# Patient Record
Sex: MALE | Race: WHITE
[De-identification: names, ages, dates, MRNs, and addresses within clinical notes are randomized per-mention and may not be internally consistent; named-entity substitution may affect disease eponyms.]

---

## 2017-10-17 NOTE — CT
CT GUIDED LUMBAR ASPIRATION:

 

History: 

62-year-old male with retrothecal fluid collection within laminectomy defect at L4 and L4-5. 

 

Technique: 

Signed informed consent obtained. Patient was placed prone on CT table. Skin over the lower back was
 prepped and draped in the usual sterile fashion. 25 gauge needle was used to apply buffered Lidocai
ne superficially. Utilizing CT guidance, first a 22 gauge spinal needle was incrementally advanced i
nto the region of the laminectomy defect at the L4 level. With distal tip positioned very close to t
he dorsal surface of the thecal sac, the stylet was removed, and aspiration was attempted. There was
 no return of any fluid, either blood, pus, or CSF. The patient reported mild pain, and therefore, a
dditional Lidocaine was injected into the 22 gauge spinal needle while it was withdrawn through the 
retrospinal soft tissues. Next, an 18 gauge spinal needle was incrementally advanced at a slightly m
ore inferior position at L4, with distal tip abutting the dorsal surface of the thecal sac but not p
enetrating the thecal sac. After removal of stylet, aspiration with a 10 ml syringe yielded a tiny a
mount of viscous, bloody, particulate material (blood clots), less than 1 ml.  This was sent to [facility] for culture and sensitivity. There was no CSF return. Patient experienced bilateral lower ext
remity hypesthesia, apparently due to epidural infiltration of a small amount of the Lidocaine, whic
h subsequently resolved while the patient was in the post procedure holding room. Otherwise, there w
as no major complication, and the patient tolerated the procedure well. 

 

IMPRESSION: 

Less than 1 ml of highly viscous red material (blood clots) aspirated through 18 gauge spinal needle
 from the tiny fluid collection at the laminectomy defect abutting the dorsal surface of the thecal 
sac. This was sent to laboratory for culture and sensitivity. 

 

POS: [facility]

## 2017-11-17 NOTE — OP
DATE OF SURGERY:  11/17/2017

 

SURGEON:  L. Gerard III Toussaint, M.D.

 

ASSISTANT:  Alfredo Resendiz PA-C.

 

PREOPERATIVE INDICATION:  Prevent neurological deterioration.

 

PREOPERATIVE DIAGNOSES:  Prior lumbar decompression and fusion, epidural fluid collection, and sacral
 dermatomal paresthesias.

 

POSTOPERATIVE DIAGNOSES:  Prior lumbar decompression and fusion, epidural fluid collection, left L3 p
ars fracture, erosion of dura, pseudomeningocele, and spinal instability.

 

OPERATIVE PROCEDURE:  Reopening lumbar incision, exploration of fusion, discovery of pars fracture, d
iscovery of pseudomeningocele, repair of pseudomeningocele, microsurgery, extension of fusion, arthro
desis L3-L4, posterolateral arthrodesis L3-L4, pedicle screw and zion instrumentation L3-L5.

 

PREOPERATIVE MEDICATION:  Ancef 2 grams IV.

 

DRAIN NUMBER:  Zero.

 

DRAIN TYPE:  None.

 

OPERATIVE DICTATION:  The patient was brought to the operating room.  General endotracheal anesthesia
 was induced.  The patient was positioned prone on the operating table with his chest and hips suppor
rosa by gel-filled chest rolls.  A lateral fluoro radiograph confirmed that the previous incision woul
d give us appropriate access to the lumbosacral spine fluid collection.  The lumbar skin was sterilel
y prepped and draped.  We opened with a 10 blade knife and controlled bleeding with bipolar cautery. 
 We used monopolar cautery to dissect through scar tissue to the thoracodorsal fascia.  We incised th
e fascia in the midline and reflected the paraspinal muscles off the spinous process of L2 above and 
L5 below.  We reflected the paraspinal muscles out of the surgical cord or over to the facet joints a
t L3-L4 and L4-L5 and placed self-retaining retractors.  We identified the pedicle screw and zion comp
oscar at the L3-4, L4-5 facet joints.  In the L3-4 facet joint on the right side, there was CSF egress.
  Scar tissue was left on the back of the dura until we brought the operating microscope into the [facility].

 

Under microscopic magnification using microsurgical techniques, we carefully peeled the scar tissue a
way from the lateral confines of the spinal canal.  We removed the remainder of the L3 lamina and we 
discovered a pars fracture on the left at L3.  This was not predicted.  The distal part of the facet 
complex below the pars fracture had moved medially and was surrounded by granulation tissue and stuck
 to the dura, any manipulation of that bony material resulted in CSF egress.  We carefully and circum
ferentially dissected dura around the lateral aspect of the previous decompression all the way down t
o L5 and back up under the broken facet joint on the left side.  Finally, we removed a fragment of jessy
ne and there was a small ulceration in the dura through which nerve roots were visible and there was 
CSF egress.  Two separate figure-of-eight sutures were placed with 6-0 Prolene under the operating mi
croscope.  This occluded the opening and a Valsalva maneuver did not produce any CSF flow.  We turned
 our attention to fixation of the fracture.  Indeed, there was a very easy lateral translation of L3 
relative to L4.  There was a very easy posterior translation of L3-L4.  We felt that the pars fractur
e was unstable and likely to lead to future neurological decline.  We elected to fix it.  We carefull
y dissected in the lateral recess to identify the intervertebral space at L3-L4 for the potential tra
nsforaminal interbody device because of the inflammation produced by the CSF egress from the previous
 surgery.  Dura and scar tissue were densely adherent and there was no mobilizing thecal sac medially
.  We elected to place pedicle screws at L3 and not place an interbody device.  The operating microsc
ope was taken out of the field.

 

Using bony anatomic landmarks, palpation of the medial portion of the pedicles and lateral fluoro rad
iograph as a guide, we chose entry points for pedicle screws at L3.  We advanced our entry point thro
ugh the pedicles into the vertebral bodies with the bone awl.  We tapped the trajectories and probed 
them and found them completely encased in bone.  We placed 6.5 x 55 mm screws bilaterally at L3.  AP 
and lateral fluoro radiographs confirmed the adequate position of the new instrumentation.  We then t
urned our attention to the removal of the prior rods.  Using a torque-counter-torque mechanism, we op
ened caps at L4 and L5 removed rods bilaterally.  We brought new rods into the field and bent them to
 encompass the new pedicle screw heads.  We placed rods in position and tightened new caps over the r
ods.  Using torque-counter-torque mechanism, we ensured adequate tightness.  We irrigated copiously w
ith bacitracin irrigation.  We then decorticated the transverse processes of L3 and L4 and the fusion
 mass bilaterally.  We left demineralized bone matrix and morselized autograft in the posterolateral 
recesses as our fusion substrate.  The morselized autograft was obtained from our new laminectomy bon
e and facetectomy bone that was carefully cleaned of its soft tissue attachments, morselized, then ad
ded to the demineralized bone matrix as our fusion substrate.  We irrigated the center of the wound o
nce again with bacitracin irrigation.  We applied a Valsalva maneuver and still saw no CSF egress fro
m our dural repair.  We reinforced our repair with DuraSeal tissue sealant.  We placed vancomycin pow
yovani in the wound and we closed the wound in anatomic layers.  We applied a sterile dressing.  This wa
s a clean case and no contamination.

## 2017-11-17 NOTE — HP
CHIEF COMPLAINT:  Paresthesias in the perineum.

 

HISTORY OF PRESENT ILLNESS:  He has an MRI from [facility].  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  He had good recovery from surger
y and was doing well until about 10 days ago fell about a month ago when he started getting paresthes
ias in the perineum.  He has had no fever, chills, sweats, rigors.  There are no radicular pains in t
he leg.  There is no incontinence.  No weakness or loss of sensation.  There is feeling in the sacral
 dermatomes, but also tingling.  He has an MRI to review.  Mr. Juarez had a previous L3-L5 laminectomy
 and L4-L5 TLIF in 02/2017

 

REVIEW OF SYSTEMS:  Ten-point review of systems was negative, unless otherwise mentioned in the above
 HPI.

 

PAST MEDICAL HISTORY:  Spinal stenosis, varicosis veins, CAD, RCA stent, ventral hernia, right inguin
al hernia, cataracts.

 

MEDICATIONS:  Tizanidine HCL, diclofenac, Nexium and loratadine.

 

ALLERGIES:  Seasonal, MOTRIN.

 

PHYSICAL EXAMINATION:

HEENT:  Normocephalic, atraumatic.  Hearing intact.  Moist mucous membranes.  Trachea is midline.  No
 masses noted.

RESPIRATIONS:  The patient has bilateral symmetric chest rise.  Appears to be no shortness breath.

CARDIOVASCULAR:  Regular rate and rhythm, normal S1, S2 heart sounds, no distal cyanosis or clubbing.


PSYCHIATRIC:  Normal mood and affect.

NEUROLOGIC:  Cranial nerves II-XII grossly intact.  Speech is fluent, answers my questions appropriat
ely.  Gait and station are normal.  Motor exam:  There is normal strength in biceps, triceps, and wri
st extensors, finger extensors, and interossei.  Sensory exam, paresthesias in S2, S3, and S4.  No fo
wes loss of sensation.  Culture no growth at 5 days.

 

ASSESSMENT:  Low back pain, lumbar spinal stenosis, intraspinal abscess and granuloma.

 

PLAN:  Dr. Toussaint has offered Mr. Juarez a reopening of lumbar incision, I and D culture and possib
le dural repair.  We discussed the risks, benefits, and possible complications of surgery.  The patie
nt is fully aware of the risks and is willing to proceed with the surgery.

## 2017-11-18 NOTE — EKG
Test Reason : PREOP

Blood Pressure : ***/*** mmHG

Vent. Rate : 064 BPM     Atrial Rate : 064 BPM

   P-R Int : 162 ms          QRS Dur : 088 ms

    QT Int : 404 ms       P-R-T Axes : 068 029 052 degrees

   QTc Int : 416 ms

 

Normal sinus rhythm

Normal ECG

When compared with ECG of 30-JAN-2017 06:33,

No significant change was found

Confirmed by PATTI WILCOX,  SSari ([id number]) on 11/18/2017 10:03:37 AM

 

Referred By:  CASTILLO           Confirmed By:DR. MARY ARMSTRONG MD

## 2019-08-28 NOTE — HP
HISTORY OF PRESENT ILLNESS:  Mr. Juarez is a 64-year-old male.  He is back in the

clinic.  Two and a half years ago, he had a decompression with fusion L3 through L5.

 In 2017, we took him back 8 months later for a dural repair, requiring

a patch.  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

[place].  Over the last 3 months, he has noticed right greater than left

L3 radicular pain.  This prevents him from helping his wife with work around the

home.  He has gone back to doing his exercises, taking anti-inflammatories and

modified his work load.  He has had no relief. 



PAST MEDICAL HISTORY:  Spinal stenosis, varicose veins,  CAD with RCA stent, ventral

hernia, right inguinal hernia, cataracts. 



ALLERGIES:  SEASONAL, AND MOTRIN.



MEDICATIONS:  Tizanidine, diclofenac, Nexium, loratadine.



PAST SURGICAL HISTORY:  Right inguinal hernia repair, ventral hernia repair, CAD

with RCA stent, cataracts, lumbar laminectomy and fusion, lumbar washout, dural

repair. 



FAMILY HISTORY:  Father is .  Mother is .



SOCIAL HISTORY:  The patient is a former smoker.



PHYSICAL EXAMINATION:

CONSTITUTIONAL:  Alert, oriented. 

NEUROLOGIC:  Gait and station, leans a bit.  Motor exam, mild weakness in the right

greater the left iliopsoas.  Normal quad strength distally.  Sensory exam, some l3

affected, right greater than left. 

RESPIRATION:  Normal work of breathing on room air.  Symmetric chest rise.



IMAGING:  MRI, herniated nucleus pulposus post fusion at L2-L3, both lateral

recesses are affected.  Stenosis is severe.  The right L3 foramen looks tight as

well. 



ASSESSMENT AND PLAN:  Lumbar spinal stenosis with radiculopathy.  Dr. Toussaint has

offered surgery.  The patient states he understands the risks of surgery and is

willing to proceed. 







Job ID:  [id number]

## 2019-08-29 ENCOUNTER — HOSPITAL ENCOUNTER (OUTPATIENT)
Dept: HOSPITAL 92 - SDC | Age: 64
Discharge: HOME | End: 2019-08-29
Attending: NEUROLOGICAL SURGERY
Payer: MEDICARE

## 2019-08-29 VITALS — BODY MASS INDEX: 0.1 KG/M2

## 2019-08-29 DIAGNOSIS — Z88.0: ICD-10-CM

## 2019-08-29 DIAGNOSIS — Z95.5: ICD-10-CM

## 2019-08-29 DIAGNOSIS — Z98.890: ICD-10-CM

## 2019-08-29 DIAGNOSIS — Z79.1: ICD-10-CM

## 2019-08-29 DIAGNOSIS — M51.16: Primary | ICD-10-CM

## 2019-08-29 DIAGNOSIS — Z79.899: ICD-10-CM

## 2019-08-29 DIAGNOSIS — Z87.891: ICD-10-CM

## 2019-08-29 DIAGNOSIS — Z98.1: ICD-10-CM

## 2019-08-29 DIAGNOSIS — I25.10: ICD-10-CM

## 2019-08-29 DIAGNOSIS — M48.061: ICD-10-CM

## 2019-08-29 DIAGNOSIS — Z88.8: ICD-10-CM

## 2019-08-29 LAB
ANION GAP SERPL CALC-SCNC: 13 MMOL/L (ref 10–20)
APTT PPP: 26.1 SEC (ref 22.9–36.1)
BASOPHILS # BLD AUTO: 0.1 THOU/UL (ref 0–0.2)
BASOPHILS NFR BLD AUTO: 1.2 % (ref 0–1)
BUN SERPL-MCNC: 13 MG/DL (ref 8.4–25.7)
CALCIUM SERPL-MCNC: 10.1 MG/DL (ref 7.8–10.44)
CHLORIDE SERPL-SCNC: 104 MMOL/L (ref 98–107)
CO2 SERPL-SCNC: 28 MMOL/L (ref 23–31)
CREAT CL PREDICTED SERPL C-G-VRATE: 79 ML/MIN (ref 70–130)
EOSINOPHIL # BLD AUTO: 0.8 THOU/UL (ref 0–0.7)
EOSINOPHIL NFR BLD AUTO: 11.5 % (ref 0–10)
GLUCOSE SERPL-MCNC: 104 MG/DL (ref 80–115)
HGB BLD-MCNC: 13.8 G/DL (ref 14–18)
INR PPP: 0.9
LYMPHOCYTES # BLD: 2.5 THOU/UL (ref 1.2–3.4)
LYMPHOCYTES NFR BLD AUTO: 34.1 % (ref 21–51)
MCH RBC QN AUTO: 30.8 PG (ref 27–31)
MCV RBC AUTO: 90.2 FL (ref 78–98)
MONOCYTES # BLD AUTO: 0.8 THOU/UL (ref 0.11–0.59)
MONOCYTES NFR BLD AUTO: 11.1 % (ref 0–10)
NEUTROPHILS # BLD AUTO: 3.1 THOU/UL (ref 1.4–6.5)
NEUTROPHILS NFR BLD AUTO: 42.1 % (ref 42–75)
PLATELET # BLD AUTO: 306 THOU/UL (ref 130–400)
POTASSIUM SERPL-SCNC: 4.4 MMOL/L (ref 3.5–5.1)
PROTHROMBIN TIME: 12.1 SEC (ref 12–14.7)
RBC # BLD AUTO: 4.48 MILL/UL (ref 4.7–6.1)
SODIUM SERPL-SCNC: 141 MMOL/L (ref 136–145)
WBC # BLD AUTO: 7.4 THOU/UL (ref 4.8–10.8)

## 2019-08-29 PROCEDURE — 85025 COMPLETE CBC W/AUTO DIFF WBC: CPT

## 2019-08-29 PROCEDURE — 85730 THROMBOPLASTIN TIME PARTIAL: CPT

## 2019-08-29 PROCEDURE — 80048 BASIC METABOLIC PNL TOTAL CA: CPT

## 2019-08-29 PROCEDURE — 36415 COLL VENOUS BLD VENIPUNCTURE: CPT

## 2019-08-29 PROCEDURE — 93010 ELECTROCARDIOGRAM REPORT: CPT

## 2019-08-29 PROCEDURE — 01NB0ZZ RELEASE LUMBAR NERVE, OPEN APPROACH: ICD-10-PCS | Performed by: NEUROLOGICAL SURGERY

## 2019-08-29 PROCEDURE — 93005 ELECTROCARDIOGRAM TRACING: CPT

## 2019-08-29 PROCEDURE — 76000 FLUOROSCOPY <1 HR PHYS/QHP: CPT

## 2019-08-29 PROCEDURE — 0ST20ZZ RESECTION OF LUMBAR VERTEBRAL DISC, OPEN APPROACH: ICD-10-PCS | Performed by: NEUROLOGICAL SURGERY

## 2019-08-29 PROCEDURE — 85610 PROTHROMBIN TIME: CPT

## 2019-08-29 NOTE — OP
DATE OF PROCEDURE:  08/29/2019



ASSISTANT:  Yola Palumbo PA-C.



PREOPERATIVE INDICATION:  Treat pain and prevent neurological deterioration.



PREOPERATIVE DIAGNOSIS:  Adjacent segment intervertebral disk disease with

radiculopathies, right greater than left, at L2-L3. 



POSTOPERATIVE DIAGNOSIS:  Adjacent segment intervertebral disk disease with

radiculopathies, right greater than left, at L2-L3. 



OPERATIVE PROCEDURES:  

1. Reopening of lumbar incision.

2. Dissection of scar tissue.

3. L2-L3 laminectomy, medial facetectomy, foraminotomy.

4. Bilateral microdiskectomy.

5. Operating microscope.



PREOPERATIVE MEDICATION:  Ancef 2 g IV.



DRAIN NUMBERS:  Zero.



DRAIN TYPE:  None.



DESCRIPTION OF PROCEDURE:  The patient was brought to the operating room.  General

endotracheal anesthesia was induced.  The patient was positioned prone on the

operating table.  His chest and hips supported by gel-filled chest rolls.  A lateral

fluoro radiograph was used to plan our incision.  The lumbar skin was sterilely

prepped and draped.  We reopened his midline incision, the top half of it, and

dissected through scar tissue.  We controlled bleeding with bipolar cautery.  We

used monopolar cautery to dissect through our scar tissue to the thoracodorsal

fascia.  We incised the fascia in the midline and reflected the paraspinal muscles

off the spinous process and lamina of L2 and the facet joints at L2-L3 as well as

the inferior portion of L1.  A self-retaining retractor was placed and a lateral

fluoro radiograph was used to confirm the levels upon which we were operating.  We

then used Adson rongeur to remove the L2 spinous process.  A high-speed drill was

brought into the field.  Using a cande bur, we thinned the lamina of L2.  Using a

2-mm Kerrison rongeur, we  the scar tissue from the undersurface of the

lamina and performed the laminectomy.  We widened our laminectomy defect until we

were in line with the medial border of the pedicles at L2.  The operative microscope

was brought into the field. 



Under microscopic magnification using microsurgical techniques, we carefully

dissected through scar tissue, freeing the common thecal sac and the L2 and L3 nerve

roots from surrounding scar.  This was tedious and required meticulous

microdissection.  Finally, we can mobilize the nerve roots medially and under the

ventral aspect of the common thecal sac and each of the L3 nerve roots.  There was

significant amount of loose disk material, which was removed in piecemeal fashion.

We reached into the interspace from both sides and removed loose fragments of disk

from the interspace.  The remainder of the disk was firmly adherent to the

endplates.  We carefully performed foraminotomies around the L2 and the L3 nerve

roots until a Cowan ball probe could pass through the lateral recess out the

foramen with each of the 4 nerve roots without any compression whatsoever.  We

irrigated with bacitracin irrigation.  We infused local anesthetic in the paraspinal

muscles.  We treated the wound with vancomycin powder and we closed in anatomical

layers.  This was a clean case, no contamination. 







Job ID:  [id number]

## 2020-09-15 ENCOUNTER — HOSPITAL ENCOUNTER (OUTPATIENT)
Dept: HOSPITAL 9 - MADRAD | Age: 65
Discharge: HOME | End: 2020-09-15
Payer: MEDICARE

## 2020-09-15 DIAGNOSIS — M50.30: Primary | ICD-10-CM

## 2020-09-15 DIAGNOSIS — M47.812: ICD-10-CM

## 2020-09-15 PROCEDURE — 72040 X-RAY EXAM NECK SPINE 2-3 VW: CPT

## 2020-09-15 NOTE — RAD
CERVICAL SPINE 3 VIEWS:

 

Date:  09/15/2020

 

HISTORY:  

Neck pain. DJD. 

 

FINDINGS:

There are moderately severe degenerative changes at the C5-6 level. Mild degenerative changes seen at
 the other levels. At C5-6, there is loss of disc space, wedging of the C5 and C6 vertebra with promi
nent anterior osteophytes and posterior spondylosis which encroach into the spinal canal. 

 

The other disc spaces are preserved. Mild spurring from the other vertebra. Slight posterolisthesis a
t C5-6 which is associated with posterior spondylosis. Alignment at the other levels appear normally 
maintained. Facet hypertrophy is noted. 

 

IMPRESSION: 

Degenerative changes of the cervical spine, most pronounced at C5-6, as described. 

 

 

POS: [facility]